# Patient Record
Sex: MALE | Employment: FULL TIME | URBAN - METROPOLITAN AREA
[De-identification: names, ages, dates, MRNs, and addresses within clinical notes are randomized per-mention and may not be internally consistent; named-entity substitution may affect disease eponyms.]

---

## 2017-09-15 PROBLEM — K21.9 GASTROESOPHAGEAL REFLUX DISEASE WITHOUT ESOPHAGITIS: Status: ACTIVE | Noted: 2017-09-15

## 2021-05-25 ENCOUNTER — TELEPHONE (OUTPATIENT)
Dept: PHARMACY | Facility: CLINIC | Age: 66
End: 2021-05-25

## 2021-05-25 NOTE — TELEPHONE ENCOUNTER
Maliha/Carlene, Please schedule pt for office visit (the June OV was cancelled) so we can evaluate and discuss. Ariela, thanks for the heads up. We will address this at the next OV.

## 2021-05-25 NOTE — TELEPHONE ENCOUNTER
Nichole Bowers MD - would patient benefit from statin therapy? Patient with DM, LDL > 100 and not currently prescribed statin therapy. Per current ADA guidelines, patient should be prescribed at least a moderate-intensity statin. Patient was prescribed atorvastatin in 2014, but stopped taking it - would recommend trial of rosuvastatin 5 mg daily. I can contact the patient to discuss any changes in therapy. Thank you,  Marilyn Samano, PharmD, [facility]  Direct: [phone]  Department, toll free [phone], option 7    ==============================================================    [facility]: STATIN THERAPY REVIEW  Identified statin use in persons with diabetes care gap per [employer]. Records dated: 5/7/21. Last Office Visit: 10/28/20    Patient also appears to be taking: lisinopril and metformin. Patient not found in Outcomes MTM    ASSESSMENT  ACE/ARB ADHERENCE    Per Insurance Records through 5/7/21 (YTD [place] Lori = 88%; Needs 104 days to be adherent):   Lisinopril last filled on 4/24/21 for 90 day supply. Next refill due: 7/23/21    BP Readings from Last 3 Encounters:   10/28/20 104/62   08/05/20 118/70   09/30/19 118/70     CrCl cannot be calculated (Patient's most recent lab result is older than the maximum 120 days allowed. ). DIABETES ADHERENCE    Per Insurance Records through 5/7/21 (YTD [place] = 87%; Needs 106 days to be adherent):   Metformin last filled on 4/24/21 for 90 day supply. Next refill due: 7/23/21    Lab Results   Component Value Date    LABA1C 6.8 10/28/2020    LABA1C 7.4 09/30/2019    LABA1C 7.7 07/01/2019     NOTE A1c not yet completed this calendar year    STATIN GAP IDENTIFIED    Per chart review, patient was previously prescribed atorvastatin 10 mg daily in 2014. Stopped d/t \"patient choice\".     Lab Results   Component Value Date    CHOL 176 07/25/2019    TRIG 107 07/25/2019    HDL 40

## 2021-05-26 NOTE — TELEPHONE ENCOUNTER
Thank you for the quick response! Will alirio for f/u on 6/18/21 to see if an appointment has been scheduled.      Cody Hernandez, PharmD, [place]  Direct: [phone]  Department, toll free [phone], option 7    For Pharmacy [id number] [place] [address] Road in place:  No   Recommendation Provided To: Provider: 1 via Note to Provider   Intervention Detail: New Rx: 1, reason: Needs Additional Therapy   Gap Closed?: No    Total # of Interventions Recommended: 1   Total # of Interventions Accepted: 0   Intervention Accepted By: Provider: 0   Time Spent (min): 15

## 2021-10-27 PROBLEM — N18.32 STAGE 3B CHRONIC KIDNEY DISEASE (HCC): Status: ACTIVE | Noted: 2021-10-27

## 2021-12-08 PROBLEM — E11.22 TYPE 2 DIABETES MELLITUS WITH CHRONIC KIDNEY DISEASE (HCC): Status: ACTIVE | Noted: 2021-12-08

## 2021-12-08 PROBLEM — Z79.84 LONG TERM CURRENT USE OF ORAL HYPOGLYCEMIC DRUG: Status: ACTIVE | Noted: 2021-12-08

## 2024-12-13 LAB — DIABETIC RETINOPATHY: NEGATIVE
